# Patient Record
Sex: MALE | Race: WHITE | NOT HISPANIC OR LATINO | URBAN - METROPOLITAN AREA
[De-identification: names, ages, dates, MRNs, and addresses within clinical notes are randomized per-mention and may not be internally consistent; named-entity substitution may affect disease eponyms.]

---

## 2020-06-03 ENCOUNTER — CONVERSION ENCOUNTER (OUTPATIENT)
Dept: GASTROENTEROLOGY | Facility: CLINIC | Age: 60
End: 2020-06-03

## 2020-06-03 ENCOUNTER — OFFICE VISIT CONVERTED (OUTPATIENT)
Dept: GASTROENTEROLOGY | Facility: CLINIC | Age: 60
End: 2020-06-03
Attending: NURSE PRACTITIONER

## 2020-08-31 ENCOUNTER — OFFICE VISIT CONVERTED (OUTPATIENT)
Dept: GASTROENTEROLOGY | Facility: CLINIC | Age: 60
End: 2020-08-31
Attending: NURSE PRACTITIONER

## 2021-05-13 NOTE — PROGRESS NOTES
Progress Note      Patient Name: Fan Martinez   Patient ID: [id number]   Sex: Male   YOB: 1960    Primary Care Provider: Isabel Mauro MD   Referring Provider: Isabel Mauro MD    Visit Date: August 31, 2020    Provider: KOTA Sol   Location: [facility]   Location Address: [address] [place] [address], [address]  [place], [place]  [id number]   Location Phone: [phone]          Chief Complaint     F/u irregular bowel pattern       History Of Present Illness     Mr. Martinez presents for follow-up of constipation, diarrhea, diverticulosis and irregular bowel habits.    He reports that probiotics were helpful for irregular bowel pattern.  He reports that it took about 6 weeks, but now is having a daily bowel movement.  He's continuing to take probiotics and states that he's feeling much better.  Denies rectal bleeding and abdominal pain.      Denies dysphagia.                 Past Medical History  Diverticulitis         Past Surgical History  Colon resection; Colonoscopy; EGD         Medication List  Florajen3 460 mg (7.5-6- 1.5 bill. cell) oral capsule         Allergy List  NO KNOWN DRUG ALLERGIES       Allergies Reconciled  Family Medical History  *No Known Family History         Social History  Alcohol (Never); Recreational Drug Use (Current - status unknown); Tobacco (Never)         Review of Systems  · Constitutional  o Denies  o : chills, fever  · Cardiovascular  o Denies  o : chest pain, irregular heart beats  · Respiratory  o Denies  o : cough, shortness of breath  · Gastrointestinal  o Admits  o : see HPI   · Endocrine  o Denies  o : weight gain, weight loss      Vitals  Date Time BP Position Site L\R Cuff Size HR RR TEMP (F) WT  HT  BMI kg/m2 BSA m2 O2 Sat        08/31/2020 01:40 /75 Sitting      98.1 292lbs 16oz 6'   39.74 2.6           Physical Examination  · Constitutional  o Appearance  o : Healthy-appearing, awake and alert in no acute  distress  · Head and Face  o Head  o : Normocephalic with no worriesome skin lesions  · Eyes  o Vision  o :   § Visual Fields  § : eyes move symmetrical in all directions  o Sclerae  o : sclerae anicteric  o Pupils and Irises  o : pupils equal and symmetrical  · Neck  o Inspection/Palpation  o : Trachea is midline, no adenopathy  · Respiratory  o Respiratory Effort  o : Breathing is unlabored.  o Inspection of Chest  o : normal appearance  o Auscultation of Lungs  o : Chest is clear to auscultation bilaterally.  · Cardiovascular  o Heart  o :   § Auscultation of Heart  § : no murmurs, rubs, or gallops  o Peripheral Vascular System  o :   § Extremities  § : no cyanosis, clubbing or edema;   · Gastrointestinal  o Abdominal Examination  o : Abdomen is soft, nontender to palpation, with normal active bowel sounds, no appreciable hepatosplenomegaly.  o Digital Rectal Exam  o : deferred  · Skin and Subcutaneous Tissue  o General Inspection  o : without focal lesions; turgor is normal  · Psychiatric  o General  o : Alert and oriented x3  o Mood and Affect  o : Mood and affect are appropriate to circumstances  · Extremities  o Extremities  o : No edema, no cyanosis          Assessment  · Irritable bowel syndrome with both constipation and diarrhea     564.1/K58.2      Plan  · Medications  o Medications have been Reconciled  · Instructions  o Information given on current diagnoses.  · Disposition  o Follow up PRN - Call if any change in bowel pattern, abdominal pain, rectal bleeding, or any new GI complaint.            Electronically Signed by: Katt Harrell APRN -Author on August 31, 2020 03:37:35 PM

## 2021-05-14 VITALS
BODY MASS INDEX: 39.68 KG/M2 | SYSTOLIC BLOOD PRESSURE: 148 MMHG | HEIGHT: 72 IN | WEIGHT: 293 LBS | DIASTOLIC BLOOD PRESSURE: 75 MMHG | TEMPERATURE: 98.1 F

## 2021-05-15 VITALS
HEIGHT: 60 IN | TEMPERATURE: 97.5 F | WEIGHT: 287.5 LBS | SYSTOLIC BLOOD PRESSURE: 140 MMHG | BODY MASS INDEX: 56.44 KG/M2 | DIASTOLIC BLOOD PRESSURE: 77 MMHG

## 2021-07-20 NOTE — H&P
History and Physical      Patient Name: Fan Martinez   Patient ID: [id number]   Sex: Male   YOB: 1960    Primary Care Provider: Isabel Mauro MD    Visit Date: Brenna 3, 2020    Provider: KOTA Sol   Location: [facility]   Location Address: [address] [place] [address], [address]  [place]  [id number]   Location Phone: [phone]          Chief Complaint     irregular bowel pattern       History Of Present Illness  The patient is a 60 year old male who presents on referral from Isabel Mauro MD for a gastroenterology evaluation.      He presents for evaluation of alternating between diarrhea and constipation that has been present for about 2 months.        He reports that when he has a bowel movement, he has both liquid and formed stool.  Doesn't feel like he ever gets empty.  He reports up to 5-7 bowel movements per day that are mostly in the morning.  He reports seeing BRB on the toilet paper when wiping.  Describes stool to be explosive.      Denies abdominal and rectal pain.  Admits intermittent pressure in lower abdomen.      History of RMSF last year.  He reports having an irregular bowel pattern at that time.  He was tested for RMSF last month and tested positive.      He reports a history of diverticulitis with colon resection 14 years ago at [facility].      Previous colonoscopy at [facility] last year.  Reports that he did not have any colon polyps.      Denies family history of colorectal cancer.    [place] - submitted stool specimen recently.      8/15/2017 colonoscopy-diffuse diverticulosis.  1 individual large diverticulum with mild inflammation at 40 cm.  Otherwise normal colonoscopy.    12/21/2012 EGD-esophageal stricture dilated.       Past Medical History  Diverticulitis         Past Surgical History  Colon resection; Colonoscopy; EGD         Allergy List  NO KNOWN DRUG ALLERGIES       Allergies Reconciled  Family Medical History  *No Known  Family History         Social History  Alcohol (Never); Recreational Drug Use (Current - status unknown); Tobacco (Never)         Review of Systems  · Constitutional  o Denies  o : chills, fever  · Eyes  o Denies  o : blurred vision, changes in vision  · Cardiovascular  o Denies  o : chest pain, irregular heart beats  · Respiratory  o Denies  o : shortness of breath, cough  · Gastrointestinal  o Admits  o : See HPI  · Genitourinary  o Denies  o : dysuria, blood in urine  · Integument  o Denies  o : rash, new skin lesions  · Neurologic  o Denies  o : tingling or numbness, seizures  · Musculoskeletal  o Denies  o : joint pain, joint swelling  · Endocrine  o Denies  o : weight gain, weight loss  · Psychiatric  o Denies  o : anxiety, depression      Vitals  Date Time BP Position Site L\R Cuff Size HR RR TEMP (F) WT  HT  BMI kg/m2 BSA m2 O2 Sat HC       06/03/2020 01:09 /77 Sitting      97.5 287lbs 8oz 1'   1403.7 1.05           Physical Examination  · Constitutional  o Appearance  o : well developed, well-nourished, in no acute distress  · Eyes  o Vision  o :   § Visual Fields  § : eyes move symmetrical in all directions  o Sclerae  o : anicteric  o Pupils and Irises  o : pupils equal and symmetrical  · Neck  o Inspection/Palpation  o : supple  · Respiratory  o Respiratory Effort  o : breathing unlabored  o Inspection of Chest  o : normal appearance, no retractions  o Auscultation of Lungs  o : clear to auscultation bilaterally  · Cardiovascular  o Heart  o :   § Auscultation of Heart  § : no murmurs, gallops or rubs  · Gastrointestinal  o Abdominal Examination  o : soft, nontender to palpation, with normal active bowel sounds, no appreciable hepatosplenomegaly  o Digital Rectal Exam  o : deferred  · Lymphatic  o Neck  o : no palpable lymphadenopathy  · Skin and Subcutaneous Tissue  o General Inspection  o : without focal lesions; turgor is normal  · Psychiatric  o General  o : Alert and oriented x3  o Mood and  Affect  o : Mood and affect are appropriate to circumstances  · Extremities  o Extremities  o : No edema, no cyanosis          Assessment  · Constipation     564.00/K59.00  · Diarrhea     787.91/R19.7  · Diverticulosis     562.10/K57.90  · Irregular bowel habits     569.89/R19.8      Plan  · Medications  o Florajen3 460 mg (7.5-6- 1.5 bill. cell) oral capsule   SIG: take 1 capsule by oral route daily for 30 days   DISP: (30) capsules with 5 refills  Prescribed on 06/03/2020     o Medications have been Reconciled  · Instructions  o Information given on current diagnoses. Discussed with patient starting probiotics for irregular bowel pattern. If no improvement, consider repeat colonoscopy. Obtain stool studies from outside facility.   · Disposition  o Follow up 3 months            Electronically Signed by: KOTA Sol -Author on June 5, 2020 01:46:22 PM   calm, cooperative

## 2021-08-27 RX ORDER — L.ACIDOPH/B.ANIMALIS/B.LONGUM 15B CELL
1 CAPSULE ORAL DAILY
COMMUNITY
Start: 2021-07-28

## 2021-08-30 RX ORDER — L.ACIDOPH/B.ANIMALIS/B.LONGUM 15B CELL
CAPSULE ORAL
Qty: 30 CAPSULE | Refills: 0 | Status: SHIPPED | OUTPATIENT
Start: 2021-08-30 | End: 2021-10-04

## 2021-10-04 RX ORDER — L.ACIDOPH/B.ANIMALIS/B.LONGUM 15B CELL
CAPSULE ORAL
Qty: 30 CAPSULE | Refills: 0 | Status: SHIPPED | OUTPATIENT
Start: 2021-10-04 | End: 2021-11-02

## 2021-11-02 RX ORDER — L.ACIDOPH/B.ANIMALIS/B.LONGUM 15B CELL
CAPSULE ORAL
Qty: 30 CAPSULE | Refills: 2 | Status: SHIPPED | OUTPATIENT
Start: 2021-11-02 | End: 2022-02-15

## 2022-02-15 RX ORDER — L.ACIDOPH/B.ANIMALIS/B.LONGUM 15B CELL
CAPSULE ORAL
Qty: 30 CAPSULE | Refills: 0 | Status: SHIPPED | OUTPATIENT
Start: 2022-02-15 | End: 2022-03-21

## 2022-03-21 RX ORDER — L.ACIDOPH/B.ANIMALIS/B.LONGUM 15B CELL
CAPSULE ORAL
Qty: 30 CAPSULE | Refills: 0 | Status: SHIPPED | OUTPATIENT
Start: 2022-03-21 | End: 2022-04-19

## 2022-04-19 RX ORDER — L.ACIDOPH/B.ANIMALIS/B.LONGUM 15B CELL
CAPSULE ORAL
Qty: 30 CAPSULE | Refills: 0 | Status: SHIPPED | OUTPATIENT
Start: 2022-04-19 | End: 2022-05-23

## 2022-05-23 RX ORDER — L.ACIDOPH/B.ANIMALIS/B.LONGUM 15B CELL
CAPSULE ORAL
Qty: 30 CAPSULE | Refills: 0 | Status: SHIPPED | OUTPATIENT
Start: 2022-05-23 | End: 2022-06-30

## 2022-06-30 RX ORDER — L.ACIDOPH/B.ANIMALIS/B.LONGUM 15B CELL
CAPSULE ORAL
Qty: 30 CAPSULE | Refills: 0 | Status: SHIPPED | OUTPATIENT
Start: 2022-06-30

## 2022-08-03 RX ORDER — L.ACIDOPH/B.ANIMALIS/B.LONGUM 15B CELL
CAPSULE ORAL
Qty: 30 CAPSULE | Refills: 0 | OUTPATIENT
Start: 2022-08-03